# Patient Record
Sex: FEMALE | Race: OTHER | HISPANIC OR LATINO | URBAN - METROPOLITAN AREA
[De-identification: names, ages, dates, MRNs, and addresses within clinical notes are randomized per-mention and may not be internally consistent; named-entity substitution may affect disease eponyms.]

---

## 2022-06-12 ENCOUNTER — EMERGENCY (EMERGENCY)
Facility: HOSPITAL | Age: 37
LOS: 1 days | Discharge: HOME | End: 2022-06-12
Attending: STUDENT IN AN ORGANIZED HEALTH CARE EDUCATION/TRAINING PROGRAM
Payer: COMMERCIAL

## 2022-06-12 VITALS
TEMPERATURE: 98 F | HEART RATE: 53 BPM | DIASTOLIC BLOOD PRESSURE: 80 MMHG | RESPIRATION RATE: 18 BRPM | OXYGEN SATURATION: 99 % | SYSTOLIC BLOOD PRESSURE: 133 MMHG

## 2022-06-12 VITALS
SYSTOLIC BLOOD PRESSURE: 143 MMHG | OXYGEN SATURATION: 98 % | DIASTOLIC BLOOD PRESSURE: 81 MMHG | RESPIRATION RATE: 18 BRPM | WEIGHT: 164.91 LBS | TEMPERATURE: 99 F | HEART RATE: 64 BPM

## 2022-06-12 LAB
ALBUMIN SERPL ELPH-MCNC: 4.1 G/DL — SIGNIFICANT CHANGE UP (ref 3.3–5.2)
ALP SERPL-CCNC: 105 U/L — SIGNIFICANT CHANGE UP (ref 40–120)
ALT FLD-CCNC: 13 U/L — SIGNIFICANT CHANGE UP
ANION GAP SERPL CALC-SCNC: 13 MMOL/L — SIGNIFICANT CHANGE UP (ref 5–17)
AST SERPL-CCNC: 16 U/L — SIGNIFICANT CHANGE UP
BASOPHILS # BLD AUTO: 0.05 K/UL — SIGNIFICANT CHANGE UP (ref 0–0.2)
BASOPHILS NFR BLD AUTO: 0.4 % — SIGNIFICANT CHANGE UP (ref 0–2)
BILIRUB SERPL-MCNC: <0.2 MG/DL — LOW (ref 0.4–2)
BUN SERPL-MCNC: 10.1 MG/DL — SIGNIFICANT CHANGE UP (ref 8–20)
CALCIUM SERPL-MCNC: 8.7 MG/DL — SIGNIFICANT CHANGE UP (ref 8.6–10.2)
CHLORIDE SERPL-SCNC: 103 MMOL/L — SIGNIFICANT CHANGE UP (ref 98–107)
CO2 SERPL-SCNC: 24 MMOL/L — SIGNIFICANT CHANGE UP (ref 22–29)
CREAT SERPL-MCNC: 0.6 MG/DL — SIGNIFICANT CHANGE UP (ref 0.5–1.3)
EGFR: 118 ML/MIN/1.73M2 — SIGNIFICANT CHANGE UP
EOSINOPHIL # BLD AUTO: 0.05 K/UL — SIGNIFICANT CHANGE UP (ref 0–0.5)
EOSINOPHIL NFR BLD AUTO: 0.4 % — SIGNIFICANT CHANGE UP (ref 0–6)
GLUCOSE SERPL-MCNC: 93 MG/DL — SIGNIFICANT CHANGE UP (ref 70–99)
HCG SERPL-ACNC: <4 MIU/ML — SIGNIFICANT CHANGE UP
HCT VFR BLD CALC: 34.9 % — SIGNIFICANT CHANGE UP (ref 34.5–45)
HGB BLD-MCNC: 11.3 G/DL — LOW (ref 11.5–15.5)
HIV 1 & 2 AB SERPL IA.RAPID: SIGNIFICANT CHANGE UP
IMM GRANULOCYTES NFR BLD AUTO: 0.4 % — SIGNIFICANT CHANGE UP (ref 0–1.5)
LIDOCAIN IGE QN: 28 U/L — SIGNIFICANT CHANGE UP (ref 22–51)
LYMPHOCYTES # BLD AUTO: 2.84 K/UL — SIGNIFICANT CHANGE UP (ref 1–3.3)
LYMPHOCYTES # BLD AUTO: 21.9 % — SIGNIFICANT CHANGE UP (ref 13–44)
MCHC RBC-ENTMCNC: 28.5 PG — SIGNIFICANT CHANGE UP (ref 27–34)
MCHC RBC-ENTMCNC: 32.4 GM/DL — SIGNIFICANT CHANGE UP (ref 32–36)
MCV RBC AUTO: 88.1 FL — SIGNIFICANT CHANGE UP (ref 80–100)
MONOCYTES # BLD AUTO: 1.06 K/UL — HIGH (ref 0–0.9)
MONOCYTES NFR BLD AUTO: 8.2 % — SIGNIFICANT CHANGE UP (ref 2–14)
NEUTROPHILS # BLD AUTO: 8.91 K/UL — HIGH (ref 1.8–7.4)
NEUTROPHILS NFR BLD AUTO: 68.7 % — SIGNIFICANT CHANGE UP (ref 43–77)
PLATELET # BLD AUTO: 307 K/UL — SIGNIFICANT CHANGE UP (ref 150–400)
POTASSIUM SERPL-MCNC: 4.3 MMOL/L — SIGNIFICANT CHANGE UP (ref 3.5–5.3)
POTASSIUM SERPL-SCNC: 4.3 MMOL/L — SIGNIFICANT CHANGE UP (ref 3.5–5.3)
PROT SERPL-MCNC: 7.3 G/DL — SIGNIFICANT CHANGE UP (ref 6.6–8.7)
RBC # BLD: 3.96 M/UL — SIGNIFICANT CHANGE UP (ref 3.8–5.2)
RBC # FLD: 12.6 % — SIGNIFICANT CHANGE UP (ref 10.3–14.5)
SODIUM SERPL-SCNC: 140 MMOL/L — SIGNIFICANT CHANGE UP (ref 135–145)
TROPONIN T SERPL-MCNC: <0.01 NG/ML — SIGNIFICANT CHANGE UP (ref 0–0.06)
WBC # BLD: 12.96 K/UL — HIGH (ref 3.8–10.5)
WBC # FLD AUTO: 12.96 K/UL — HIGH (ref 3.8–10.5)

## 2022-06-12 PROCEDURE — 83690 ASSAY OF LIPASE: CPT

## 2022-06-12 PROCEDURE — 99285 EMERGENCY DEPT VISIT HI MDM: CPT

## 2022-06-12 PROCEDURE — 80053 COMPREHEN METABOLIC PANEL: CPT

## 2022-06-12 PROCEDURE — 36415 COLL VENOUS BLD VENIPUNCTURE: CPT

## 2022-06-12 PROCEDURE — 84484 ASSAY OF TROPONIN QUANT: CPT

## 2022-06-12 PROCEDURE — 85025 COMPLETE CBC W/AUTO DIFF WBC: CPT

## 2022-06-12 PROCEDURE — 93010 ELECTROCARDIOGRAM REPORT: CPT

## 2022-06-12 PROCEDURE — 71046 X-RAY EXAM CHEST 2 VIEWS: CPT | Mod: 26

## 2022-06-12 PROCEDURE — 86703 HIV-1/HIV-2 1 RESULT ANTBDY: CPT

## 2022-06-12 PROCEDURE — 93005 ELECTROCARDIOGRAM TRACING: CPT

## 2022-06-12 PROCEDURE — 96374 THER/PROPH/DIAG INJ IV PUSH: CPT

## 2022-06-12 PROCEDURE — 71046 X-RAY EXAM CHEST 2 VIEWS: CPT

## 2022-06-12 PROCEDURE — 99285 EMERGENCY DEPT VISIT HI MDM: CPT | Mod: 25

## 2022-06-12 PROCEDURE — 84702 CHORIONIC GONADOTROPIN TEST: CPT

## 2022-06-12 RX ORDER — FAMOTIDINE 10 MG/ML
20 INJECTION INTRAVENOUS ONCE
Refills: 0 | Status: COMPLETED | OUTPATIENT
Start: 2022-06-12 | End: 2022-06-12

## 2022-06-12 RX ORDER — LIDOCAINE 4 G/100G
10 CREAM TOPICAL ONCE
Refills: 0 | Status: COMPLETED | OUTPATIENT
Start: 2022-06-12 | End: 2022-06-12

## 2022-06-12 RX ADMIN — FAMOTIDINE 20 MILLIGRAM(S): 10 INJECTION INTRAVENOUS at 23:00

## 2022-06-12 RX ADMIN — Medication 30 MILLILITER(S): at 22:57

## 2022-06-12 RX ADMIN — LIDOCAINE 10 MILLILITER(S): 4 CREAM TOPICAL at 22:57

## 2022-06-12 NOTE — ED ADULT NURSE NOTE - NSIMPLEMENTINTERV_GEN_ALL_ED
Implemented All Universal Safety Interventions:  [place] to call system. Call bell, personal items and telephone within reach. Instruct patient to call for assistance. Room bathroom lighting operational. Non-slip footwear when patient is off stretcher. Physically safe environment: no spills, clutter or unnecessary equipment. Stretcher in lowest position, wheels locked, appropriate side rails in place.

## 2022-06-12 NOTE — ED PROVIDER NOTE - PATIENT PORTAL LINK FT
You can access the FollowMyHealth Patient Portal offered by [facility] by registering at the following website: [email or web address]. By joining [facility]’s FollowMyHealth portal, you will also be able to view your health information using other applications (apps) compatible with our system.

## 2022-06-12 NOTE — ED PROVIDER NOTE - NSFOLLOWUPINSTRUCTIONS_ED_ALL_ED_FT
1) Seguimiento con butler médico en laurita semana  2) Regrese a la yin de emergencias por empeoramiento o síntomas preocupantes  3) Prince la medicación según lo prescrito  1) Follow up with your doctor in one week  2) Return to the ER for worsening or concerning symptoms  3) Take medication as prescribed    Dolor de pecho no específico en los adultos    Nonspecific Chest Pain, Adult      El dolor de pecho es laurita sensación molesta, opresiva o dolorosa en el pecho. El dolor se siente lisseth laurita aplastamiento, torsión u opresión en el pecho. Laurita persona puede sentir laurita sensación de ardor u hormigueo. El dolor de pecho también se puede sentir en la espalda, el aditya, la mandíbula, el hombro o el brazo. Starr dolor puede empeorar al moverse, estornudar o respirar profundamente.    El dolor de pecho puede deberse a laurtia afección potencialmente mortal. Se debe tratar de inmediato. También puede ser provocado por algo que no es potencialmente mortal. Si tiene dolor de pecho, puede ser difícil saber la diferencia, por lo tanto es importante que obtenga ayuda de inmediato para asegurarse de que no tiene laurita afección grave.    Algunas causas potencialmente mortales del dolor de pecho incluyen:  •Infarto de miocardio.      •Un desgarro en el vaso sanguíneo principal del cuerpo (disección aórtica).      •Inflamación alrededor del corazón (pericarditis).      •Un problema en los pulmones, lisseth un coágulo de sandra (embolia pulmonar) o un pulmón colapsado (neumotórax).      Algunas causas de dolor de pecho que no son potencialmente mortales incluyen:  •Acidez estomacal.      •Ansiedad o estrés.      •Daño de los huesos, los músculos y los cartílagos que conforman la pared torácica.      •Neumonía o bronquitis.      •Culebrilla (virus de la varicela zóster).      El dolor de pecho puede aparecer y desaparecer. También puede ser jorge. Butler médico le hará pruebas clínicas y otros estudios para tratar de determinar la causa del dolor. El tratamiento dependerá de la causa del dolor de pecho.      Siga estas instrucciones en butler casa:    Medicamentos     •Use los medicamentos de venta joseph y los recetados solamente lisseth se lo haya indicado el médico.      •Si le recetaron un antibiótico, tómelo lisseth se lo haya indicado el médico. No deje de kaya el antibiótico aunque comience a sentirse mejor.      Actividad     •Evite las actividades que le causen dolor de pecho.      • No levante ningún objeto que pese más de 10 libras (4,5 kg) o que supere el límite de peso que le hayan indicado, hasta que el médico le diga que puede hacerlo.      •Jay Jay reposo lisseth se lo haya indicado el médico.       •Retome obdulio actividades normales solo lisseth se lo haya indicado el médico. Pregúntele al médico qué actividades son seguras para usted.        Estilo de pranay       A plate along with examples of foods in a healthy diet.       Silhouette of a person sitting on the floor doing yoga.     • No consuma ningún producto que contenga nicotina o tabaco, lisseth cigarrillos, cigarrillos electrónicos y tabaco de mascar. Si necesita ayuda para dejar de fumar, consulte al médico.      • No daniele alcohol.    •Opte por un estilo de pranay saludable lisseth se lo hayan recomendado. Puede incluir:  •Practicar actividad física con regularidad. Pídale al médico que le sugiera ejercicios que goldy seguros para usted.      •Seguir laurita dieta cardiosaludable. Esta debe incluir muchas frutas y verduras frescas, cereales integrales, proteínas (magras) con bajo contenido de grasa y productos lácteos bajos en grasa. Un nutricionista podrá ayudarlo a hacer elecciones de alimentación saludables.      •Mantener un peso saludable.      •Controlar cualquier otra afección que tenga, lisseth presión arterial vcítor (hipertensión) o diabetes.      •Disminuir el nivel de estrés; por ejemplo, con yoga o técnicas de relajación.        Instrucciones generales     •Esté atento a cualquier cambio en los síntomas.      •Es butler responsabilidad obtener los resultados de cualquier prueba que se haya realizado. Consulte al médico o pregunte en el departamento donde se realizan las pruebas cuándo estarán listos los resultados.      •Concurra a todas las visitas de seguimiento lisseth se lo haya indicado el médico. [place] es importante.       •Es posible que le pidan que se someta a más pruebas si el dolor de pecho no desaparece.        Comuníquese con un médico si:    •El dolor de pecho no desaparece.      •Se siente deprimido.      •Tiene fiebre.      •Nota cambios en los síntomas o desarrolla síntomas nuevos.        Solicite ayuda de inmediato si:    •El dolor en el pecho empeora.      •Tiene tos que empeora o tose con sandra.      •Siente un dolor intenso en el abdomen.      •Se desmaya.      •Tiene laurita molestia repentina e inexplicable en el pecho.      •Tiene molestias repentinas e inexplicables en los brazos, la espalda, el aditya o la mandíbula.      •Le falta el aire en cualquier momento.      •Comienza a sudar de manera repentina o la piel se le humedece.      •Siente náuseas o vomita.      •Se siente repentinamente mareado o se desmaya.      •Tiene debilidad intensa, o debilidad o fatiga sin explicación.      •Siente que el corazón comienza a latir rápidamente o que se saltea latidos.      Estos síntomas pueden representar un problema grave que constituye laurita emergencia. No espere a justus si los síntomas desaparecen. Solicite atención médica de inmediato. Comuníquese con el servicio de emergencias de butler localidad (911 en los [place]). No conduzca por obdulio propios medios hasta el hospital.       Resumen    •El dolor de pecho puede ser provocado por laurita afección que es grave y requiere tratamiento urgente. También puede ser provocado por algo que no es potencialmente mortal.      •El médico puede hacerle pruebas clínicas y otros estudios para tratar de determinar la causa del dolor.      •Siga las instrucciones de butler médico sobre kaya medicamentos, hacer cambios en butler estilo de pranay y recibir tratamiento de urgencia si los síntomas empeoran.      •Concurra a todas las visitas de seguimiento lisseth se lo haya indicado el médico. [place] incluye las visitas para realizarle otras pruebas si el dolor de pecho no desaparece.      Esta información no tiene lisseth fin reemplazar el consejo del médico. Asegúrese de hacerle al médico cualquier pregunta que tenga.      Enfermedad de reflujo gastroesofágico en los adultos    Gastroesophageal Reflux Disease, Adult       El reflujo gastroesofágico (RGE) ocurre cuando el ácido del estómago sube por el tubo que conecta la boca con el estómago (esófago). Normalmente, la comida baja por el esófago y se mantiene en el estómago, donde se la digiere. Sin embargo, cuando laurita persona tiene ERGE, los alimentos y el ácido estomacal suelen volver al esófago. Si esto se vuelve un problema más grave, a la persona se le puede diagnosticar laurita enfermedad llamada enfermedad de reflujo gastroesofágico (ERGE). La ERGE ocurre cuando el reflujo:  •Sucede a menudo.       •Causa síntomas frecuentes o graves.       •Causa problemas tales lisseth daño en el esófago.      Cuando el ácido del estómago entra en contacto con el esófago, el ácido puede provocar inflamación en el esófago. Con el tiempo, pueden formarse pequeños agujeros (úlceras) en el revestimiento del esófago.      ¿Cuáles son las causas?    Esta afección se debe a un problema en el músculo que se encuentra entre el esófago y el estómago (esfínter esofágico inferior, o EEI). Normalmente, el EEI se james laurita vez que la comida pasa a través del esófago hasta el estómago. Cuando el EEI se encuentra debilitado o tiene alguna anomalía, no se james por completo, y eso permite que tanto la comida lisseth el jugo gástrico, que es ácido, vuelvan a subir por el esófago.    El EEI puede debilitarse a causa de ciertas sustancias alimenticias, medicamentos y afecciones, que incluyen:  •El consumo de tabaco.      •Embarazo.      •Tener laurita hernia de hiato.      •Consumo de alcohol.      •Ciertos alimentos y bebidas, lisseth café, chocolate, cebollas y menta.        ¿Qué incrementa el riesgo?    Es más probable que tenga esta afección si:  •Tiene un aumento del peso corporal.      •Tiene un trastorno del tejido conjuntivo.      •Dorys antiinflamatorios no esteroideos (MURRAY), lisseth el ibuprofeno.        ¿Cuáles son los signos o síntomas?    Los síntomas de esta afección incluyen:  •Acidez estomacal.      •Dificultad o dolor para tragar o la sensación de tener un bulto en la garganta.      •Sabor amargo en la boca.      •Mal aliento y tener gran cantidad de saliva.      •Estómago inflamado o con malestar y eructos.      •Dolor en el pecho. El dolor de pecho puede deberse a distintas afecciones. Es importante que consulte al médico si tiene dolor de pecho.      •Dificultad para respirar o sibilancias.      •Tos jorge (crónica) o tos nocturna.      •Desgaste del esmalte dental.      •Pérdida de peso.        ¿Cómo se diagnostica?    Esta afección se puede diagnosticar en función de los antecedentes médicos y un examen físico. Para determinar si tiene ERGE leve o grave, el médico también puede controlar cómo usted reacciona al tratamiento. También pueden hacerle estudios, que incluyen los siguientes:  •Un estudio para examinarle el estómago y el esófago con laurita cámara pequeña (endoscopía).      •Laurita prueba para medir el denia de acidez en el esófago.      •Laurita prueba para medir cuánta presión hay en el esófago.      •Un estudio de deglución con bario común o modificado para justus la forma, el tamaño y el funcionamiento del esófago.        ¿Cómo se trata?    El tratamiento de esta afección puede variar según la gravedad de los síntomas. El médico puede recomendarle lo siguiente:  •Cambios en la dieta.      •Medicamentos.      •Cirugía.      El objetivo del tratamiento es ayudar a aliviar los síntomas y evitar las complicaciones.      Siga estas instrucciones en butler casa:      Comida y bebida    •Siga la dieta recomendada por el médico. [place] puede incluir evitar ciertos alimentos y bebidas, por ejemplo:  •Café y té morris, con o sin cafeína.      •Bebidas que contengan alcohol.      •Bebidas energéticas y deportivas.      •Bebidas gaseosas o refrescos.      •Chocolate y cacao.      •Menta y esencia de menta.      •[place] y cebolla.      •Rábano picante.      •Alimentos condimentados, picantes y ácidos, por ejemplo, todos los tipos de pimientas, chile en polvo, peterson en polvo, vinagre, salsas picantes y salsa barbacoa.      •Cítricos y obdulio jugos, por ejemplo, naranjas, duane y paul.      •Alimentos a base de tomate, lisseth salsa de tomate, chile, salsa picante y pizza con salsa de tomate.      •Alimentos fritos y grasos, lisseth donas, neda fritas y aderezos ricos en grasas.      •Kevin con alto contenido de grasa, lisseth salchichas, y zimmerman de kevin bernabe y librado con mucha grasa, por ejemplo, chuletas o costillas, embutidos, jamón y tocino.      •Productos lácteos ricos en grasas, lisseth leche entera, manteca y queso crema.        •Jay Jay comidas pequeñas y frecuentes abdifatah el día en lugar de comidas abundantes.      •Evite beber grandes cantidades de líquidos con las comidas.      •Evite comer 2 o 3 horas antes de acostarse.      •Evite recostarse inmediatamente después de comer.      • No jay jay ejercicios enseguida después de comer.        Estilo de pranay      • No consuma ningún producto que contenga nicotina o tabaco. Estos productos incluyen cigarrillos, tabaco para mascar y aparatos de vapeo, lisseth los cigarrillos electrónicos. Si necesita ayuda para dejar de fumar, consulte al médico.      •Trate de reducir el estrés con métodos lisseth el yoga o la meditación. Si necesita ayuda para reducir el nivel de estrés, consulte al médico.      •Si tiene sobrepeso, baje hasta llegar a un peso saludable para usted. Pídale consejos al médico para bajar de peso de manera seymour.      Instrucciones generales     •Esté atento a cualquier cambio en los síntomas.      •Use los medicamentos de venta joseph y los recetados solamente lisseth se lo haya indicado el médico. No tome aspirina, ibuprofeno ni otros antiinflamatorios no esteroideos (MURRAY) a menos que el médico le haya indicado que tome estos medicamentos.      •Use ropa suelta. No use nada apretado alrededor de la cintura que jay jay presión sobre el abdomen.      •Levante (eleve) la cabecera de la cama aproximadamente 6 pulgadas (15 cm). Para hacerlo puede usar laurita cuña.      •Evite inclinarse si al hacerlo empeoran los síntomas.      •Cumpla con todas las visitas de seguimiento. [place] es importante.        Comuníquese con un médico si:  •Tiene los siguientes síntomas:  •Síntomas nuevos.      •Pérdida de peso sin causa aparente.      •Dificultad o dolor al tragar.      •Sibilancias o laurita tos persistente.      •Voz ronca.        •Los síntomas no mejoran con el tratamiento.        Solicite ayuda de inmediato si:    •Siente dolor repentino en los brazos, el aditya, la mandíbula, los dientes o la espalda.      •De repente se siente transpirado, mareado o aturdido.      •Siente falta de aire o dolor en el pecho.      •Vomita y el vómito es de color olga, amarillo o morris, o tiene un aspecto similar a la sandra o a los posos de café.      •Se desmaya.      •Tiene heces bernabe, sanguinolentas o negras.      •No puede tragar, beber o comer.      Estos síntomas pueden representar un problema grave que constituye laurita emergencia. No espere a justus si los síntomas desaparecen. Solicite atención médica de inmediato. Comuníquese con el servicio de emergencias de butler localidad (911 en los [place]). No conduzca por obdulio propios medios hasta el hospital.       Resumen    •El reflujo gastroesofágico ocurre cuando el ácido del estómago sube al esófago. La ERGE es laurita enfermedad en la que el reflujo ocurre con frecuencia, causa síntomas frecuentes o graves, o causa problemas tales lisseth daño en el esófago.      •El tratamiento de esta afección puede variar según la gravedad de los síntomas. El médico puede indicarle que siga laurita dieta y jay jay cambios en butler estilo de pranay, tome medicamentos o se someta a laurita cirugía.      •Comuníquese con un médico si tiene síntomas nuevos o los síntomas empeoran.      •Use los medicamentos de venta joseph y los recetados solamente lisseth se lo haya indicado el médico. No tome aspirina, ibuprofeno ni otros antiinflamatorios no esteroideos (MURRAY) a menos que el médico se lo indique.      •Concurra a todas las visitas de seguimiento lisseth se lo haya indicado el médico. [place] es importante.      Esta información no tiene lisseth fin reemplazar el consejo del médico. Asegúrese de hacerle al médico cualquier pregunta que tenga.      Opciones de alimentos para pacientes adultos con enfermedad de reflujo gastroesofágico    Food Choices for Gastroesophageal Reflux Disease, Adult      Si tiene enfermedad de reflujo gastroesofágico (ERGE), los alimentos que consume y los hábitos de alimentación son muy importantes. Elegir los alimentos adecuados puede ayudar a aliviar las molestias ocasionadas por la ERGE. Considere la posibilidad de trabajar con un nutricionista que lo ayude a hacer elecciones saludables.      Consejos para seguir starr plan    Leer las etiquetas de los alimentos     •Elija alimentos que tengan bajo contenido de grasas saturadas. Los alimentos que tienen menos del 5 % de los valores diarios (VD) de grasa y 0 g de grasas trans pueden aliviar los síntomas.      Al cocinar     •Evite freír los alimentos a la hora de la cocción. En butler lugar, puede hacerlos al horno, al vapor, a la plancha o en la kassandra. Estos son métodos que no necesitan mucha grasa para cocinar.      •Para agregar sabor, trate de consumir hierbas con bajo contenido de picante y acidez.        Planificación de las comidas      •Elija alimentos saludables con bajo contenido de grasa, lisseth frutas, verduras, cereales integrales, productos lácteos descremados, carne magra de fiorella, de pescado y de ave.      •Jay Jay comidas pequeñas con frecuencia en lugar de aryan comidas abundantes al día. Coma lentamente, en un ambiente distendido. Evite agacharse o recostarse hasta 2 o 3 horas después de chris comido.      •Limite los alimentos con alto contenido graso lisseth las kevin grasas o los alimentos fritos.      •Limite butler ingesta de alimentos grasos, lisseth aceites, manteca y margarina.    •Si el médico se lo ha indicado, evite lo siguiente:  •Consumir alimentos que le ocasionen síntomas. Pueden ser distintos para cada persona. Lleve un registro de los alimentos para identificar aquellos que le causen síntomas.      •Alcohol.      •Beber grandes cantidades de líquido con las comidas.      •[place] 2 o 3 horas antes de acostarse.        Estilo de pranay     •Mantenga un peso saludable. Pregunte a butler médico cuál es un peso saludable para usted. Si debe perder peso, hable con butler médico para hacerlo de manera seymour.      •Realice actividad física abdifatah, al menos, 30 minutos 5 días por semana o más, o según lo indicado por butler médico.      •Evite usar ropa ajustada alrededor de la cintura y el pecho.      • No consuma ningún producto que contenga nicotina o tabaco. Estos productos incluyen cigarrillos, tabaco para mascar y aparatos de vapeo, lisseth los cigarrillos electrónicos. Si necesita ayuda para dejar de fumar, consulte al médico.      •Duerma con la cabecera de la cama elevada. Use laurita cuña debajo del colchón o bloques debajo del armazón de la cama para mantener la cabecera de la cama elevada.      •Mastique chicle sin azúcar después de las comidas.        ¿Qué alimentos addie comer?     Siga laurita dieta saludable y niraj equilibrada que incluya abundantes frutas, verduras, cereales integrales, productos lácteos descremados, kevin magras, pescado y aves. Cada persona es diferente. Los alimentos que pueden desencadenar síntomas en laurita persona pueden no desencadenar ningún síntoma en otra. Trabaje con el médico para identificar cuáles son los alimentos seguros para usted.    Es posible que los productos mencionados arriba no formen laurita lista completa de las bebidas o los alimentos recomendados. Consulte a un nutricionista para obtener más información.       ¿Qué alimentos addie evitar?    Limitar algunos de estos alimentos puede ayudar a controlar los síntomas de la enfermedad de reflujo gastroesofágico (ERGE). Cada persona es diferente. Consulte a un nutricionista o al médico para que lo ayude a identificar los alimentos exactos que debe evitar, si los hubiere.    Frutas     Cualquier fruta que esté preparada con grasa agregada. Cualquier fruta que le ocasione síntomas. Para algunas personas, estas pueden incluir, las frutas cítricas lisseth naranja, pomelo, hdz y vj.    Verduras     Verduras fritas en abundante aceite. Ndea fritas. Cualquier verdura que esté preparada con grasa agregada. Cualquier verdura que le ocasione síntomas. Para algunas personas, estas pueden incluir tomates y productos con tomate, ajíes, cebollas y ajo, y rábanos picantes.    Granos     Pasteles o panes sin levadura con grasa agregada.    Kevin y otras proteínas     Kevin de alto contenido graso lisseth carne grasa de fiorella o cerdo, salchichas, costillas, jamón, salchicha, jerzy y tocino. Kevin o proteínas fritas, lo que incluye pescado frito y wei frito. Celina secos y mantequillas de celina secos, en grandes cantidades.    Lácteos     Leche entera y leche con chocolate. Crema agria. Crema. Helado. Queso crema. Batidos con leche.    Grasas y aceites     [place]. Margarina. Lardo. [place] clarificada.    Bebidas     Café y té morris, con o sin cafeína. Bebidas con gas. Refrescos. Bebidas energizantes. Jugo de fruta hecho con frutas ácidas, lisseth naranja o pomelo. Jugo de tomate. Bebidas alcohólicas.    Dulces y postres     Chocolate y cacao. Rosquillas.    Aliños y condimentos     Jayden. Menta y mentol. Sal agregada. Cualquier condimento, hierbas o aderezos que le ocasionen síntomas. Para algunas personas, esto puede incluir peterson, salsa picante o aderezos para ensalada a base de vinagre.    Es posible que los productos que se enumeran más arriba no goldy laurita lista completa de los alimentos y las bebidas que se deben evitar. Consulte a un nutricionista para obtener más información.       Preguntas para hacerle al médico    Los cambios en la dieta y en el estilo de pranay habitualmente son los primeros pasos que se arturo para manejar los síntomas de ERGE. Si los cambios en la dieta y en el estilo de pranay no mejoran obdulio síntomas, hable con el médico sobre medicamentos.      Dónde buscar más información    •International Foundation for Gastrointestinal Disorders (Fundación Internacional para los Trastornos Gastrointestinales): aboutgerd.org        Resumen    •Si tiene enfermedad por reflujo gastroesofágico (ERGE), las elecciones de alimentos y estilo de pranay pueden ser muy útiles para ayudar a aliviar las molestias de la ERGE.      •Jay Jay comidas pequeñas con frecuencia en lugar de aryan comidas abundantes al día. Coma lentamente, en un ambiente distendido. Evite agacharse o recostarse hasta 2 o 3 horas después de chris comido.      •Limite los alimentos con alto contenido graso lisseth las kevin grasas o los alimentos fritos.      Esta información no tiene lisseth fin reemplazar el consejo del médico. Asegúrese de hacerle al médico cualquier pregunta que tenga.

## 2022-06-12 NOTE — ED PROVIDER NOTE - MUSCULOSKELETAL, MLM
(+)no lower leg edema, no calf pain, spine appears normal, range of motion is not limited, no muscle or joint tenderness

## 2022-06-12 NOTE — ED ADULT NURSE NOTE - CAS TRG GEN SKIN CONDITION
Problem: Patient Care Overview  Goal: Plan of Care Review  Outcome: Ongoing (interventions implemented as appropriate)  Plan of care reviewed with patient. Patient verbalized complete understanding. Fall/safety precautions maintained. SCDs on, Slip resistant socks on. Bed in lowest position, locked, call light within reach. Bed alarm on, Side rails up x's 2. Nurse instructed patient to notify staff for any assistance and pt verbalized complete understanding. Pt turned Q2h to prevent skin breakdown, Foam dressing to sacrum changed, dressing CDI. Pt on continuous LR running at 50 ml/hr.  Pt 99% on RA. Lactate trends reviewed. Troponin's elevated. BUN/Creat WNL. Pt sleeping comfortably throughout shift. No acute distress noted, will continue to monitor.   Pt on telemetry, no ectopy or true red alarms noted. Afib w/PVCs, HR 70's          Warm

## 2022-06-12 NOTE — ED PROVIDER NOTE - NEURO NEGATIVE STATEMENT, MLM
(+)headache, dizziness, no loss of consciousness, no gait abnormality, no sensory deficits, and no weakness.

## 2022-06-12 NOTE — ED ADULT NURSE NOTE - OBJECTIVE STATEMENT
Patient A&Ox4, complaining of chest pain radiating down left arm. Patient states she also has a cough and headache for a few days now. Patient states she has mild SOB, but denies dizziness. Patient's respirations even and unlabored, VSS, placed on cardiac and pulse oximetry monitoring.

## 2022-06-12 NOTE — ED PROVIDER NOTE - GASTROINTESTINAL NEGATIVE STATEMENT, MLM
(+)indigestion, abdominal pain, no bloating, no constipation, no diarrhea, no nausea and no vomiting.

## 2022-06-12 NOTE — ED ADULT TRIAGE NOTE - CHIEF COMPLAINT QUOTE
C/O chest pain that radiates down her left arm that began when waking up today. +SOB and headache. Denies cardiac hx.

## 2022-06-12 NOTE — ED PROVIDER NOTE - OBJECTIVE STATEMENT
37y female pt with pmhx of polycystic ovaries presents to ED c/o midsternal chest pain that began 3 days ago, lasted all day resolved with advil, but has returned today. Pt states she had a very bad headache and pain in her chest described as pressure radiating to her bilateral shoulders with left side worse, neck, and slight back pain as well as a feeling of indigestion in her stomach. Pt has an intermittent headache, yesterday radiating to her whole head, but today it was concentrated to one area. States she frequently gets headaches when ovulating which she is right now. States she was fine yesterday and the chest pain returned this morning with the same associated symptoms. Pt states she also has some sob and dizziness as well as occasional coughing.   : [id number]  denies: family hx of lung disease, change in diet, new supplements,  pharmacy : [facility]

## 2022-06-12 NOTE — ED PROVIDER NOTE - CLINICAL SUMMARY MEDICAL DECISION MAKING FREE TEXT BOX
Pt likely with symptoms secondary to acid reflux vs peptic ulcers disease will obtain labs, xray, if negative, pt stable for outpatient and further management.

## 2022-06-13 RX ORDER — OMEPRAZOLE 10 MG/1
1 CAPSULE, DELAYED RELEASE ORAL
Qty: 14 | Refills: 0
Start: 2022-06-13 | End: 2022-06-26

## 2024-12-05 NOTE — ED PROVIDER NOTE - PROGRESS NOTE
PULMONARY AND SLEEP MEDICINE CONSULTATION  PRIMARY CARE PHYSICIAN: Claudia Lozoya MD  REFERRING PHYSICIAN: Claudia Lozoya MD    REASON FOR CONSULT: Sleep difficulty  HISTORY OF PRESENT ILLNESS/SUBJECTIVE   I was asked to see Rin Smith at the request of Claudia Lozoya MD for Sleep medicine consultation.  Patient is a 74 year old female    Here to establish care.    For the last 3-4 years has been having trouble staying asleep, also falling asleep.  Uses Tylenol PM and finds benefit.  Also little improvement after Gabapentin started for RLS in 2022.    Patient goes to bed at 10-11pm and takes 30 minutes to fall asleep on average, 3-4 times/week is up to 60 minutes and wakes up at 7am  Patient reports 3-4 awakenings secondary to unknown reasons or urination - takes 30 minutes to fall back asleep  Planned Naps:     Daily 30-60 minutes at 330pm  Dozing off unintentionally (inactive):  Unless she does not nap or if it is later in the day    While asleep -   Gagging, gasping, choking, snorting causing awakening:  Yes  Patient wakes up refreshed in the morning:    No  Tired or fatigued during the day:      Yes  Patient reports excessive daytime hyper somnolence:   Yes, naps daily  Patient's bed partner has noted snoring:     Yes  Patient's bed partner has noted apneic episode:   No  Morning head ache:       No  Drooling:         No  Dry mouth:         Yes  Mouth breather:        No    Leg discomfort at night that makes you want to move them or stretch:  RLS as below  Sleepwalking, talking, eating, dream enactment:     Sleep talking only  Night terrors or nightmares:        No  Sleep paralysis:          No  Hypnogogic or hypnopompic hallucinations or loss of muscle tone:   No  Sleep attacks:          No    Patient reads, eat or watch television in bed:   No  Patient uses caffeinated products before bedtime:  No  Patient uses ETOH before bedtime:    No  Patient is currently on narcotic therapy:     No  Patient is currently on benzodiazepine therapy:  No  Is on Desvenlafaxine    His [place] sleepiness score is 6    Drowsy driving:    No  MVA due to sleepiness:   No    Weight change in the last year:  No  BMI: 26.16  Bicarb: 30 in 07/2024    Past Medical History -   CHF   No  Atrial Fibrillation No  Stroke   No  Chronic Pain  No    Family h/o sleep disorders -  RLS - Mother, CHLOE - Sister    Occupation   Retired, was in insurance    PSG: None    RLS -   Diagnosed 2022  On Gabapentin 600 mg nightly    A. An urge to move the legs, usually accompanied by or thought to be caused by uncomfortable and unpleasant sensations in the legs. These symptoms must:  - Begin or worsen during periods of rest or inactivity such as lying down or sitting;  - Be partially or totally relieved by movement, such as walking or stretching, at least as long as the activity continues; and  - Occur exclusively or predominantly in the evening or night rather than during the day.  B. The above features are not solely accounted for as symptoms of another medical or a behavioral condition (eg, leg cramps, positional discomfort, myalgia, venous stasis, leg edema, arthritis, habitual foot tapping).  C. The symptoms of RLS cause concern, distress, sleep disturbance, or impairment in mental, physical, social, occupational, educational, behavioral, or other important areas of functioning.    Family h/o RLS - Mother    Serum Ferritin level - (>75) - None on file  Iron Saturation - (>20%) - None on file    Medical conditions - iron deficiency, end-stage renal disease, diabetes mellitus, peripheral neuropathies, celiac disease, lumbosacral radiculopathy, narcolepsy with cataplexy    Caffeine intake - 2-3 cups, last 1030am    Meds - is on Desvenlafaxine, also on until recently on Loratadine (not anymore)    Medications - not on antihistamines, lithium, calcium channel blockers, neuroleptics, dopamine-blocking drugs including antiemetics (such as  metoclopramide)     Medications - not tried  Iron supplementation  Pramipexole (Augmentation, impulse control)  Ropinirole  Rotigotine patch  Pregabalin  Infrequently used - opioids, benzodiazepines, benzo receptor agonist    Pulmonary History -   Smoked in her 20s.  No difficulty breathing and no chronic cough.  Last CT Chest 01/20204 without concerning findings.  PAST MEDICAL, SURGICAL, SOCIAL AND FAMILY HISTORY     Past Medical History:   Diagnosis Date    Abnormal blood smear 06/2016    Smudge cells: heme ref    Anxiety disorder     AR (allergic rhinitis) 04/08/2014    Atrophic vaginitis 03/27/2012    Benign neoplasm of colon 10/2001    Colon Polyp    Benign neoplasm of thyroid glands     Broken arm     years ago    Cataracts, bilateral     Chronic bilateral low back pain without sciatica: chiro 06/14/2016    controlled    Disorder of bone and cartilage, unspecified 03/27/2012    Hyperparathyroidism 1994    Irritable bowel syndrome     years ago    OAB (overactive bladder) 10/18/2011    osteopenia: rept bmd 2017 03/27/2012    Other enthesopathy of knee     tendinitis of lt knee years ago    RAD (reactive airway disease) (CMD)     years ago inhaler with excercise induced asthma    Unspecified hypothyroidism 03/27/2012    Urinary incontinence     With urinary urgency    Urinary tract infection      Past Surgical History:   Procedure Laterality Date    Breast biopsy Right     prior outside breast biospsy- negative (many years ago)    Cardiac stress test complete  06/1997    Cardiac Stress Test, Thallium, myocardial ischemia similar to stress test 6/97, nl scan.    Colonoscopy diagnostic  05/04/2012    Hemorrhoids, otherwise normal, 10 yr recall    Correct bunion Bilateral     Dexa bone density axial skeleton  10/93, 2/18/11    nl    Eye surgery Right 04/24/2018    Cataract (To have Left eye done 5/15/18)    Flexible sigmoidoscopy bx  10/24/2001    Flex Sig with Bx, hyperplastic polyps    Partial excision  thyroid,unilat      benign tumor, rt    Partial excision thyroid,unilat      lt thyroid lobectomy and isthmectomy for benign thyroid tumor    Past surgical history  1996    parathyroidectomy    Removal of tonsils,<11 y/o      Tonsillectomy    Thyroidectomy  1996    Thyroidectomy (complete)    Vaginal rejuvenation by fractional co2 laser package of 3 treatments  05/15/2024    Dr. Lopes     Social History     Tobacco Use    Smoking status: Former     Current packs/day: 0.00     Average packs/day: 1 pack/day for 10.0 years (10.0 ttl pk-yrs)     Types: Cigarettes     Start date: 1974     Quit date: 1984     Years since quittin.9     Passive exposure: Past    Smokeless tobacco: Former   Substance Use Topics    Alcohol use: Yes     Comment: Very occ.      Family History   Problem Relation Age of Onset    Stroke Mother     Hyperlipidemia Mother         hyperchol.    Cancer Mother         hodgkins    Hypertension Father     Lung Disease Father         asthma    Blood Disorder Sister         clotting disorder    Asthma Sister     Hypertension Sister     Cancer, Breast Maternal Aunt         unsure of age    Asthma Other         paternal side    NEGATIVE FAMILY HX OF Other         no colon or breast tumors in the immed family     PRIOR TO ADMISSION MEDICATIONS   Reviewed today***  ALLERGIES     ALLERGIES:   Allergen Reactions    Adhesive   (Environmental) Other (See Comments)     reddness    Cat Dander Other (See Comments)     Congestion    Oxybutynin DIZZINESS and Palpitations     Pt tolerates Mylan .      REVIEW OF SYSTEMS   Review of Systems   Constitutional:  Negative for chills and fever.   HENT:  Negative for sore throat and trouble swallowing.    Eyes:  Negative for visual disturbance.   Respiratory:  Negative for cough, shortness of breath and wheezing.    Cardiovascular:  Negative for chest pain, palpitations and leg swelling.   Gastrointestinal:  Negative for diarrhea, nausea and  vomiting.   Endocrine: Negative for polyuria.   Musculoskeletal:  Negative for arthralgias and back pain.   Neurological:  Negative for dizziness and syncope.   Psychiatric/Behavioral:  Negative for agitation. The patient is not nervous/anxious.      ***  OBJECTIVE   Visit Vitals  /80   Pulse 98   Ht 5' 2\" (1.575 m)   Wt 64.9 kg (143 lb)   SpO2 92%   BMI 26.16 kg/m²     PHYSICAL EXAMINATION   Physical Exam  Constitutional:       General: She is not in acute distress.     Appearance: She is not diaphoretic.   HENT:      Mouth/Throat:      Mouth: Mucous membranes are moist.   Eyes:      General: No scleral icterus.  Cardiovascular:      Rate and Rhythm: Normal rate and regular rhythm.   Pulmonary:      Effort: Pulmonary effort is normal. No respiratory distress.      Breath sounds: Normal breath sounds. No wheezing or rales.   Abdominal:      Palpations: Abdomen is soft.      Tenderness: There is no abdominal tenderness. There is no guarding.   Musculoskeletal:      Right lower leg: No edema.      Left lower leg: No edema.   Skin:     Coloration: Skin is not jaundiced.   Neurological:      Mental Status: She is alert and oriented to person, place, and time.      Comments: Able to move all 4 ext   Psychiatric:         Behavior: Behavior normal.       ***  LABORATORY DATA   Labs reviewed as above***  IMAGING AND OTHER STUDIES   Imaging and other studies reviewed as above***  ASSESSMENT AND PLAN   Rin Smith was evaluated today    Excessive Daytime Sleepiness  Insomnia - Sleep Onset and Sleep Maintenance  Overweight  RLS    Orders placed -   HST  Ferritin and Iron studies    Plan -   Stop Tylenol PM  Set wake time 6am  Limit time in bed to 7 hours in bed  Nap <60 minutes as far as possible    There is a clinical presentation consistent with CHLOE and a diagnostic PSG is recommended.    I have discussed the condition of sleep apnea and associated complications of untreated obstructive sleep apnea as well as  potential benefits of therapy. Risks of not being treated were clearly discussed including various adverse cardiovascular effects such as worsening of hypertension, heart arrhythmias, CAD, strokes and neurocognitive effects. Treatment options including weight reduction (if appropriate), oral appliance and CPAP therapy was discussed at length.  - discussed sleep hygiene and recommended to sleep at least 8 hours every night  - we recommend avoiding alcohol, benzodiazepines and narcotics which worsen sleep apnea  - we also recommend that the patient take strategic naps to avoid accidents till fully treated for the condition or if sleepy  - patient advised on sleep hygiene and advised to avoid caffeinated drinks and more so in the evenings  - patient was counseled not to drive if sleepy    All questions answered, patient agrees with plan.  Follow up in ***    Thank you for this consultation.     Anton Ford MD  Pulmonary, Sleep and Critical Care Medicine  12/5/202412:33 PM   Stable.

## 2025-01-27 NOTE — ED ADULT NURSE NOTE - BREATHING, MLM
Medication: AMITRIPTYLINE 25MG TABLETS  passed protocol.   Last office visit date: 09.19.2024  Next appointment scheduled?: Yes   Number of refills given: 1    
Spontaneous, unlabored and symmetrical